# Patient Record
Sex: FEMALE | Race: WHITE | NOT HISPANIC OR LATINO | URBAN - METROPOLITAN AREA
[De-identification: names, ages, dates, MRNs, and addresses within clinical notes are randomized per-mention and may not be internally consistent; named-entity substitution may affect disease eponyms.]

---

## 2017-04-18 ENCOUNTER — OFFICE (OUTPATIENT)
Dept: URBAN - METROPOLITAN AREA CLINIC 11 | Facility: CLINIC | Age: 47
End: 2017-04-18
Payer: COMMERCIAL

## 2017-04-18 VITALS
HEART RATE: 64 BPM | HEIGHT: 61 IN | DIASTOLIC BLOOD PRESSURE: 59 MMHG | WEIGHT: 144 LBS | SYSTOLIC BLOOD PRESSURE: 105 MMHG

## 2017-04-18 DIAGNOSIS — E73.9 LACTOSE INTOLERANCE, UNSPECIFIED: ICD-10-CM

## 2017-04-18 DIAGNOSIS — K21.9 GASTRO-ESOPHAGEAL REFLUX DISEASE WITHOUT ESOPHAGITIS: ICD-10-CM

## 2017-04-18 DIAGNOSIS — K57.30 DIVERTICULOSIS OF LARGE INTESTINE WITHOUT PERFORATION OR ABS: ICD-10-CM

## 2017-04-18 PROCEDURE — 99203 OFFICE O/P NEW LOW 30 MIN: CPT | Performed by: INTERNAL MEDICINE

## 2017-04-18 NOTE — SERVICENOTES
We discussed her chronic recurrent diverticulitis and the need for a Ct during a flare to document a recurrent location.  We discussed a higher fiber diet as well. We discussed a possible surgical option as well as potential risks of recurrent diverituclitis including perforation and abscess formation.  We discussed her GERD, the chronic use of the PPIs, a GERD diet, and the eval for Gibbons's.  We dsicussed the lactose free diet with her lactose in tolerance.

## 2017-04-18 NOTE — SERVICEHPINOTES
She presented to [facility] an associated gastroenterologist.   She had a colonscopy in 2005 with Dr. Del Valle and then then a colonoscopy about 7 years ago with Dr. Cormier and she was tolk that she had diverticulosis.  She has been concerned about perforation in that she had a colonoscopy in her 20s and had a peforation.  She was treated inpt for a week.  This colonoscopy was done for diarrhea and she was found to be lactose intolerant. She has had a history of diverticulitis with her diverticulosis.  She has been treated with antibiotics with resolution of her pain with diverticulitis.  She had a Ct years ago but had been done after antibiotics.  She thinks that she had diverticulitis once on a Ct.  Her last episode was 2-3 months ago.  She has had several episodes of possible diverticulitis and has had a couple every year since 2005.  She has not considered surgery for this. She has a history of Reflux and has been on Nexium.  She hadn an UGI a few years ago and was told taht she had a hiatal hernia.  She takes the Nexium every mornning and about twice a week has to take an "acid reducer".  She has noted that if she misses the Nexium the GERD is "really bad".  She has not had dysphagia.  She has noted that a variety of foods will cause heartburn with acid regurgitaiton.  She has not had an EGD for evaluation.Her father had colon polyps in his 60s.  BR

## 2018-05-11 ENCOUNTER — OFFICE (OUTPATIENT)
Dept: URBAN - METROPOLITAN AREA CLINIC 11 | Facility: CLINIC | Age: 48
End: 2018-05-11
Payer: COMMERCIAL

## 2018-05-11 VITALS
DIASTOLIC BLOOD PRESSURE: 70 MMHG | HEIGHT: 61 IN | WEIGHT: 147 LBS | SYSTOLIC BLOOD PRESSURE: 129 MMHG | HEART RATE: 69 BPM

## 2018-05-11 DIAGNOSIS — Z83.71 FAMILY HISTORY OF COLONIC POLYPS: ICD-10-CM

## 2018-05-11 DIAGNOSIS — K21.9 GASTRO-ESOPHAGEAL REFLUX DISEASE WITHOUT ESOPHAGITIS: ICD-10-CM

## 2018-05-11 DIAGNOSIS — Z80.0 FAMILY HISTORY OF MALIGNANT NEOPLASM OF DIGESTIVE ORGANS: ICD-10-CM

## 2018-05-11 DIAGNOSIS — K80.20 CALCULUS OF GALLBLADDER WITHOUT CHOLECYSTITIS WITHOUT OBSTRU: ICD-10-CM

## 2018-05-11 PROCEDURE — 99214 OFFICE O/P EST MOD 30 MIN: CPT | Performed by: INTERNAL MEDICINE

## 2018-05-11 RX ORDER — POLYETHYLENE GLYCOL 3350, SODIUM SULFATE, SODIUM CHLORIDE, POTASSIUM CHLORIDE, ASCORBIC ACID, SODIUM ASCORBATE 7.5-2.691G
KIT ORAL
Qty: 1 | Refills: 0 | Status: COMPLETED
Start: 2018-05-11 | End: 2020-11-10

## 2018-05-11 NOTE — SERVICEHPINOTES
She presents to discussed the recently found gall stone.  She had an u/s for evaltuion of her recurrent UTI issues and bloating symptoms.  She had a normal CT of the abdomen.  She had an u/s witha 1cm gall stone without acute changes.  She is currently asypmtomatic.  The bloating was only during the UTI and resolved with antibiotics.  She has not had issues with RUQ pain or biliary colic issues.  She has not had recurrent GERD.  She has not had difficulty with fatty foods.  She has stopped her gluten during the time of the changes in her bloating. She has had negative celiac serologies.She has a hx of GERD and on is on Nexium with good control.  She has not had Gibbons's screening and it was rec in 2017.She has a history of diverticulitis and has not had a marke recurrent issue.  She has been treated for UTIs intermittently. BR She had her last colonoscopy in 2005. She did report the perforation at age 18 and has been hesitant to do a f/u colonoscopy but is considering it now, given her family hx.

## 2018-05-11 NOTE — SERVICENOTES
We discussed the single 1cm gallstone, risks of acute cholecystitis, biliary/pancreatic complications and her lack of associated symtoms (except for the prior bloating which seems UTI related).  At this point, I would hold on an elective surgery and discussed the sx/sx that might prompt a lap katlin.  I would check the HIDA and if abnormal reconsider the options.  We discussed her chronic GERD, Gibbons's screening and longterm risks/use of the PPIs.  She will need her EGD as previously noted.  We also discussed colon cancer screening, her higher risk status, options for screening, and prior issues with colonoscopy a few decades ago.  We discussed in detail the r/b/a and expectations to colonoscopy.  She was agreeabel to the colonoscopy.

## 2020-12-03 VITALS — HEIGHT: 61 IN

## 2021-01-12 ENCOUNTER — OFFICE (OUTPATIENT)
Dept: URBAN - METROPOLITAN AREA TELEHEALTH 10 | Facility: TELEHEALTH | Age: 51
End: 2021-01-12

## 2021-01-12 DIAGNOSIS — Z80.0 FAMILY HISTORY OF MALIGNANT NEOPLASM OF DIGESTIVE ORGANS: ICD-10-CM

## 2021-01-12 DIAGNOSIS — K21.9 GASTRO-ESOPHAGEAL REFLUX DISEASE WITHOUT ESOPHAGITIS: ICD-10-CM

## 2021-01-12 RX ORDER — ESOMEPRAZOLE MAGNESIUM 40 MG/1
40 CAPSULE, DELAYED RELEASE ORAL
Qty: 90 | Refills: 3 | Status: ACTIVE

## 2021-01-12 RX ORDER — SODIUM SULFATE, POTASSIUM SULFATE, MAGNESIUM SULFATE 17.5; 3.13; 1.6 G/ML; G/ML; G/ML
SOLUTION, CONCENTRATE ORAL
Qty: 1 | Refills: 0 | Status: COMPLETED
Start: 2021-01-12 | End: 2021-03-18

## 2021-01-12 NOTE — SERVICEHPINOTES
"I just had a few questions." She had a particular question about taking Nexium long term.  She has a long hx of reflux and stated that she has been taking Nexium.  She has not had issues with dyspahgia.  She has had good control of her reflux on meds and has not had issues while on them. She was to have had an EGD in 2018 for chronic GERD and did not have it completed (in part for Gibbons's screening). She has c/o some "swelling" of her stomach at night.  She has not noted a particular food that causes issues but that overeating may be part of the cause.  This occurs mostly at night and resolves over night.  She has not had pain with it.  She has not had n/v, worsening reflux or other associatd sx.  This is not frequent and is "ever now and then".  It has not been a particular issue most of the time.  She did have an appendectomy in 2019 due to an acute appendicitis.  Dr. Thomas at [facility] was the surgeon. Her last colon was in 2005.  She has a family hx of colon cancer in her father.  Her mother has not had colonoscopy.  She had a perforation at the age of 18. She has had an maternal aunt at age 26 with colon.

## 2021-01-12 NOTE — SERVICENOTES
Pt was seen by telemed/video.  We discussed her colon cancer needs with her family hx, options, and need for the colonoscopy.  We discussed the r/b/expectations (in particular sedation/perforation risks) and she agreed to proceed.  As to her GERD, we discussed the long term use of the PPIs, Gibbons's screening by EGD and evaluation of her bloating/distension by EGD.

Call 17:12.

## 2021-03-18 ENCOUNTER — OFFICE (OUTPATIENT)
Dept: URBAN - METROPOLITAN AREA PATHOLOGY 22 | Facility: PATHOLOGY | Age: 51
End: 2021-03-18
Payer: COMMERCIAL

## 2021-03-18 ENCOUNTER — AMBULATORY SURGICAL CENTER (OUTPATIENT)
Dept: URBAN - METROPOLITAN AREA SURGERY 3 | Facility: SURGERY | Age: 51
End: 2021-03-18
Payer: COMMERCIAL

## 2021-03-18 VITALS
OXYGEN SATURATION: 96 % | HEART RATE: 76 BPM | DIASTOLIC BLOOD PRESSURE: 80 MMHG | TEMPERATURE: 97.7 F | HEART RATE: 68 BPM | OXYGEN SATURATION: 98 % | HEART RATE: 82 BPM | OXYGEN SATURATION: 95 % | SYSTOLIC BLOOD PRESSURE: 141 MMHG | RESPIRATION RATE: 18 BRPM | TEMPERATURE: 97 F | HEIGHT: 61 IN | OXYGEN SATURATION: 95 % | DIASTOLIC BLOOD PRESSURE: 73 MMHG | SYSTOLIC BLOOD PRESSURE: 122 MMHG | HEART RATE: 77 BPM | OXYGEN SATURATION: 100 % | DIASTOLIC BLOOD PRESSURE: 73 MMHG | HEART RATE: 80 BPM | DIASTOLIC BLOOD PRESSURE: 80 MMHG | OXYGEN SATURATION: 98 % | WEIGHT: 135 LBS | SYSTOLIC BLOOD PRESSURE: 126 MMHG | HEART RATE: 80 BPM | HEART RATE: 76 BPM | HEART RATE: 77 BPM | HEART RATE: 82 BPM | SYSTOLIC BLOOD PRESSURE: 126 MMHG | OXYGEN SATURATION: 100 % | RESPIRATION RATE: 18 BRPM | RESPIRATION RATE: 16 BRPM | WEIGHT: 135 LBS | TEMPERATURE: 97.7 F | SYSTOLIC BLOOD PRESSURE: 120 MMHG | SYSTOLIC BLOOD PRESSURE: 117 MMHG | HEIGHT: 61 IN | DIASTOLIC BLOOD PRESSURE: 75 MMHG | HEART RATE: 68 BPM | RESPIRATION RATE: 16 BRPM | DIASTOLIC BLOOD PRESSURE: 73 MMHG | SYSTOLIC BLOOD PRESSURE: 120 MMHG | SYSTOLIC BLOOD PRESSURE: 141 MMHG | DIASTOLIC BLOOD PRESSURE: 80 MMHG | SYSTOLIC BLOOD PRESSURE: 122 MMHG | SYSTOLIC BLOOD PRESSURE: 122 MMHG | HEIGHT: 61 IN | HEART RATE: 80 BPM | SYSTOLIC BLOOD PRESSURE: 126 MMHG | OXYGEN SATURATION: 95 % | HEART RATE: 82 BPM | TEMPERATURE: 97.7 F | DIASTOLIC BLOOD PRESSURE: 75 MMHG | SYSTOLIC BLOOD PRESSURE: 117 MMHG | TEMPERATURE: 97 F | HEART RATE: 68 BPM | SYSTOLIC BLOOD PRESSURE: 120 MMHG | OXYGEN SATURATION: 96 % | SYSTOLIC BLOOD PRESSURE: 117 MMHG | SYSTOLIC BLOOD PRESSURE: 141 MMHG | DIASTOLIC BLOOD PRESSURE: 75 MMHG | HEART RATE: 77 BPM | WEIGHT: 135 LBS | RESPIRATION RATE: 18 BRPM | OXYGEN SATURATION: 100 % | OXYGEN SATURATION: 96 % | OXYGEN SATURATION: 98 % | TEMPERATURE: 97 F | RESPIRATION RATE: 16 BRPM | HEART RATE: 76 BPM

## 2021-03-18 DIAGNOSIS — K21.9 GASTRO-ESOPHAGEAL REFLUX DISEASE WITHOUT ESOPHAGITIS: ICD-10-CM

## 2021-03-18 DIAGNOSIS — K29.50 UNSPECIFIED CHRONIC GASTRITIS WITHOUT BLEEDING: ICD-10-CM

## 2021-03-18 DIAGNOSIS — K31.89 OTHER DISEASES OF STOMACH AND DUODENUM: ICD-10-CM

## 2021-03-18 DIAGNOSIS — Z12.11 ENCOUNTER FOR SCREENING FOR MALIGNANT NEOPLASM OF COLON: ICD-10-CM

## 2021-03-18 DIAGNOSIS — Z80.0 FAMILY HISTORY OF MALIGNANT NEOPLASM OF DIGESTIVE ORGANS: ICD-10-CM

## 2021-03-18 DIAGNOSIS — K57.30 DIVERTICULOSIS OF LARGE INTESTINE WITHOUT PERFORATION OR ABS: ICD-10-CM

## 2021-03-18 PROCEDURE — 43239 EGD BIOPSY SINGLE/MULTIPLE: CPT | Mod: 51 | Performed by: INTERNAL MEDICINE

## 2021-03-18 PROCEDURE — 88313 SPECIAL STAINS GROUP 2: CPT | Performed by: INTERNAL MEDICINE

## 2021-03-18 PROCEDURE — 88342 IMHCHEM/IMCYTCHM 1ST ANTB: CPT | Performed by: INTERNAL MEDICINE

## 2021-03-18 PROCEDURE — G0105 COLORECTAL SCRN; HI RISK IND: HCPCS | Performed by: INTERNAL MEDICINE

## 2021-03-18 PROCEDURE — 88305 TISSUE EXAM BY PATHOLOGIST: CPT | Performed by: INTERNAL MEDICINE

## 2021-07-15 ENCOUNTER — OFFICE (OUTPATIENT)
Dept: URBAN - METROPOLITAN AREA CLINIC 11 | Facility: CLINIC | Age: 51
End: 2021-07-15
Payer: COMMERCIAL

## 2021-07-15 VITALS
HEIGHT: 61 IN | SYSTOLIC BLOOD PRESSURE: 101 MMHG | WEIGHT: 144 LBS | DIASTOLIC BLOOD PRESSURE: 78 MMHG | HEART RATE: 69 BPM

## 2021-07-15 DIAGNOSIS — K59.00 CONSTIPATION, UNSPECIFIED: ICD-10-CM

## 2021-07-15 DIAGNOSIS — K21.9 GASTRO-ESOPHAGEAL REFLUX DISEASE WITHOUT ESOPHAGITIS: ICD-10-CM

## 2021-07-15 PROCEDURE — 99213 OFFICE O/P EST LOW 20 MIN: CPT | Performed by: INTERNAL MEDICINE

## 2021-07-15 NOTE — SERVICEHPINOTES
"I'm doing good as long as I'm on my medicine."  She has had good control of her GERD on the Nexium.  If she misses it she will have buring in her throat and chest pain again. She has been eating well.  She has not had issues with dysphagia.  She has not issus with abdominal pain. She has been taking Plexus biocleanse daily.  This has helped her to have normal stools.  She has not had constipation while on the meds.She had a list of questions which were reviewed.  She had a benign EGD and colonoscopy in March.   We reviewed the reports.

## 2021-07-15 NOTE — SERVICENOTES
We discussed her GERD, control on meds and diet.  She had questions about gas.  We reviewed an antigas diet, weight loss plan, lower carb diet plan and need for exercise.  We discussed her constipation and use of the OTC meds and other prescription meds. We discussed her f/u colon in 5 years due to her family hx.

## 2022-05-13 ENCOUNTER — OFFICE (OUTPATIENT)
Dept: URBAN - METROPOLITAN AREA CLINIC 11 | Facility: CLINIC | Age: 52
End: 2022-05-13

## 2022-05-13 VITALS
HEIGHT: 61 IN | DIASTOLIC BLOOD PRESSURE: 71 MMHG | WEIGHT: 148 LBS | OXYGEN SATURATION: 100 % | HEART RATE: 72 BPM | SYSTOLIC BLOOD PRESSURE: 135 MMHG

## 2022-05-13 DIAGNOSIS — K76.0 FATTY (CHANGE OF) LIVER, NOT ELSEWHERE CLASSIFIED: ICD-10-CM

## 2022-05-13 DIAGNOSIS — K21.9 GASTRO-ESOPHAGEAL REFLUX DISEASE WITHOUT ESOPHAGITIS: ICD-10-CM

## 2022-05-13 DIAGNOSIS — K59.00 CONSTIPATION, UNSPECIFIED: ICD-10-CM

## 2022-05-13 PROCEDURE — 99213 OFFICE O/P EST LOW 20 MIN: CPT | Performed by: INTERNAL MEDICINE

## 2022-05-13 RX ORDER — ESOMEPRAZOLE MAGNESIUM 40 MG/1
40 CAPSULE, DELAYED RELEASE ORAL
Qty: 90 | Refills: 3 | Status: ACTIVE

## 2022-05-13 NOTE — SERVICEHPINOTES
"Its all good."   She stated that she has been doing well on her meds.  She has not had difficulties with breakthrough reflux.  She has not had difficulties with dysphagia or abdominal pain.
jeb russ She is still using the biolcleanse for her constipation.  She has had normal stools as long as she takes the supplement.  Sometimes she has to take and extra dose but over all this has improved. .
jeb russ She had her last colonoscopy in 2021 and is due in 2026 due to her family hx. jeb russ She did have Covid for the second time in December.  Sine then she has had a recurrent rash on her arms that has resolved again.  She also had issues iwth back pain under her shoulder blades.  She stated that she had an evaluation with Dr. Huitron.  She was eventually treated with antibotics and the pain resolved.  She had labs on her phone that we reviewed. jeb

## 2022-05-13 NOTE — SERVICENOTES
We discussed her GERD and breakthrough sx off of meds.  I would continue the meds for now. As to the weight issues, we discussed the above referrals and diet changes.  She has had fatty liver noted on her CT and gall stones (noted in EPIC) as well as normal LFTS.  We discussed the potential progression over time to BEJARANO.  Her constipation is well controlled.